# Patient Record
(demographics unavailable — no encounter records)

---

## 2025-06-20 NOTE — HEALTH RISK ASSESSMENT
[No] : In the past 12 months have you used drugs other than those required for medical reasons? No [Yes] : Reviewed medication list for presence of high-risk medications. [Former] : Former [5-9] : 5-9 [< 15 Years] : < 15 Years [With Family] : lives with family [Employed] : employed [College] : College [] :  [Fully functional (bathing, dressing, toileting, transferring, walking, feeding)] : Fully functional (bathing, dressing, toileting, transferring, walking, feeding) [Fully functional (using the telephone, shopping, preparing meals, housekeeping, doing laundry, using] : Fully functional and needs no help or supervision to perform IADLs (using the telephone, shopping, preparing meals, housekeeping, doing laundry, using transportation, managing medications and managing finances) [de-identified] : Quit in 2017 [Change in mental status noted] : No change in mental status noted [Reports changes in hearing] : Reports no changes in hearing

## 2025-06-20 NOTE — HISTORY OF PRESENT ILLNESS
[de-identified] : Presenting for CPE. Feels well. Working as a . . Exercises intermittently - running.  Following with psychiatry for depression and anxiety. Currently on Lexapro 20mg.  Continues to have LUTS. Improved from prior. He has intermittency + incomplete bladder emptying. No nocturia. No daytime frequency. No hematuria or dysuria.